# Patient Record
Sex: MALE | Race: WHITE | URBAN - METROPOLITAN AREA
[De-identification: names, ages, dates, MRNs, and addresses within clinical notes are randomized per-mention and may not be internally consistent; named-entity substitution may affect disease eponyms.]

---

## 2017-03-02 ENCOUNTER — ALLSCRIPTS OFFICE VISIT (OUTPATIENT)
Dept: OTHER | Facility: OTHER | Age: 22
End: 2017-03-02

## 2017-04-04 ENCOUNTER — ALLSCRIPTS OFFICE VISIT (OUTPATIENT)
Dept: OTHER | Facility: OTHER | Age: 22
End: 2017-04-04

## 2017-06-20 ENCOUNTER — ALLSCRIPTS OFFICE VISIT (OUTPATIENT)
Dept: OTHER | Facility: OTHER | Age: 22
End: 2017-06-20

## 2017-08-15 ENCOUNTER — ALLSCRIPTS OFFICE VISIT (OUTPATIENT)
Dept: OTHER | Facility: OTHER | Age: 22
End: 2017-08-15

## 2017-10-12 ENCOUNTER — ALLSCRIPTS OFFICE VISIT (OUTPATIENT)
Dept: OTHER | Facility: OTHER | Age: 22
End: 2017-10-12

## 2017-10-13 NOTE — PROGRESS NOTES
Assessment  1  Gastroenteritis (558 9) (K52 9)    Plan  Gastroenteritis    · Ondansetron HCl - 8 MG Oral Tablet; TAKE 1 TABLET Every 8 hours PRN  nausea   · *1 - [facility]  *  Status: Active   Requested for: 98YVP0855  Care Summary provided  : Yes    Discussion/Summary    Recommended GI evaluation considering frequent flareups of nausea and vomiting  He had seen gastroenterologist back in 2014encouraged him to follow-up with them as he is already taking pantoprazole daily  Call if no improvement in symptoms or any worsening or new symptoms over the coming days  Chief Complaint  nausea     History of Present Illness  HPI: Patient developed vomiting and nausea over the last 2 days  He's had 3 episodes of vomiting  Most recently this morning  He has had multiple episodes in his past medical history of these flareups  Review of Systems    Constitutional: no fever or chills, feels well, no tiredness, no recent weight loss or weight gain  ENT: no complaints of earache, no loss of hearing, no nosebleeds or nasal discharge, no sore throat or hoarseness  Cardiovascular: no complaints of slow or fast heart rate, no chest pain, no palpitations, no leg claudication or lower extremity edema  Respiratory: no complaints of shortness of breath, no wheezing or cough, no dyspnea on exertion, no orthopnea or PND  Gastrointestinal: no complaints of abdominal pain, no constipation, no nausea or vomiting, no diarrhea or bloody stools  Genitourinary: no complaints of dysuria or incontinence, no hesitancy, no nocturia, no genital lesion, no inadequacy of penile erection  Musculoskeletal: no complaints of arthralgia, no myalgia, no joint swelling or stiffness, no limb pain or swelling  Integumentary: no complaints of skin rash or lesion, no itching or dry skin, no skin wounds     Neurological: no complaints of headache, no confusion, no numbness or tingling, no dizziness or fainting  Active Problems  1  Abdominal pain, epigastric (789 06) (R10 13)   2  Acute maxillary sinusitis (461 0) (J01 00)   3  Gastroenteritis (558 9) (K52 9)   4  GERD without esophagitis (530 81) (K21 9)   5  Headache (784 0) (R51)   6  Panic attack (300 01) (F41 0)    Past Medical History  1  Acute sinusitis (461 9) (J01 90)   2  History of Acute sinusitis, recurrence not specified, unspecified location (461 9) (J01 90)   3  History of acute pharyngitis (V12 69) (Z87 09)   4  History of acute sinusitis (V12 69) (Z87 09)   5  History of acute sinusitis (V12 69) (Z87 09)   6  History of viral gastroenteritis (V12 09) (Z86 19)   7  History of Nausea (787 02) (R11 0)   8  History of Shoulder strain (840 9) (S46 919A)   9  History of Strain of thoracic region (847 1) (S29 019A)  Active Problems And Past Medical History Reviewed: The active problems and past medical history were reviewed and updated today  Family History  Mother    1  Family history of Anxiety  Brother    2  Family history of gastroesophageal reflux disease (V18 59) (Z83 79)  Family History Reviewed: The family history was reviewed and updated today  Social History   · Caffeine Use   · Never A Smoker   · Tobacco use (305 1) (Z72 0)    Current Meds   1  Fluticasone Propionate 50 MCG/ACT Nasal Suspension; USE 2 SPRAYS IN EACH   NOSTRIL ONCE DAILY; Therapy: 83MBN1345 to (Last CD:85BVX6057)  Requested for: 54WMW9447 Ordered   2  Ondansetron HCl - 8 MG Oral Tablet; TAKE 1 TABLET Every 8 hours PRN nausea; Therapy: 16SUO0372 to (Evaluate:72Qjg8369)  Requested for: 56IHI1643; Last   Rx:07Von8612 Ordered   3  Pantoprazole Sodium 40 MG Oral Tablet Delayed Release; TAKE 1 TABLET DAILY; Therapy: 17ITY5245 to (Evaluate:30Mar2018)  Requested for: 92RLN0309; Last   Rx:04Apr2017 Ordered   4  Rizatriptan Benzoate 10 MG Oral Tablet Disintegrating; TAKE 1 TABLET Daily PRN   headache;    Therapy: 96AHS5636 to (Last Rx:52Xlf1621)  Requested for: 26RHN2123 Ordered    The medication list was reviewed and updated today  Allergies  1  No Known Drug Allergies    Vitals   Recorded: 74ISS0698 01:08PM   Temperature 09 3 F   Systolic 288   Diastolic 80   Height 6 ft 2 in   Weight 252 lb    BMI Calculated 32 36   BSA Calculated 2 4     Physical Exam    Constitutional   General appearance: No acute distress, well appearing and well nourished  Eyes   Conjunctiva and lids: No swelling, erythema, or discharge  Pupils and irises: Equal, round and reactive to light  Ears, Nose, Mouth, and Throat   External inspection of ears and nose: Normal     Otoscopic examination: Tympanic membrance translucent with normal light reflex  Canals patent without erythema  Nasal mucosa, septum, and turbinates: Normal without edema or erythema  Oropharynx: Normal with no erythema, edema, exudate or lesions  Pulmonary   Respiratory effort: No increased work of breathing or signs of respiratory distress  Auscultation of lungs: Clear to auscultation, equal breath sounds bilaterally, no wheezes, no rales, no rhonci  Cardiovascular   Palpation of heart: Normal PMI, no thrills  Auscultation of heart: Normal rate and rhythm, normal S1 and S2, without murmurs  Examination of extremities for edema and/or varicosities: Normal     Carotid pulses: Normal     Abdomen   Abdomen: Non-tender, no masses  Liver and spleen: No hepatomegaly or splenomegaly  Lymphatic   Palpation of lymph nodes in neck: No lymphadenopathy  Musculoskeletal   Gait and station: Normal     Digits and nails: Normal without clubbing or cyanosis  Inspection/palpation of joints, bones, and muscles: Normal     Skin   Skin and subcutaneous tissue: Normal without rashes or lesions  Neurologic   Cranial nerves: Cranial nerves 2-12 intact  Reflexes: 2+ and symmetric  Sensation: No sensory loss      Psychiatric   Orientation to person, place and time: Normal     Mood and affect: Normal  Signatures   Electronically signed by : George Quintana DO; Oct 12 2017  1:55PM EST                       (Author)

## 2017-12-27 ENCOUNTER — ALLSCRIPTS OFFICE VISIT (OUTPATIENT)
Dept: OTHER | Facility: OTHER | Age: 22
End: 2017-12-27

## 2017-12-28 NOTE — PROGRESS NOTES
Assessment   1  Acute maxillary sinusitis (461 0) (J01 00)   2  Headache (784 0) (R51)    Plan   Acute maxillary sinusitis    · Azithromycin 250 MG Oral Tablet; TAKE 2 TABLETS ON DAY 1 THEN TAKE 1    TABLET A DAY FOR 4 DAYS   · Fluticasone Propionate 50 MCG/ACT Nasal Suspension; USE 2 SPRAYS IN EACH    NOSTRIL ONCE DAILY   · Irrigate your nose twice a day ; Status:Complete;   Done: 84TFL3597   · Call [phone] if: The fever has not gone away in 2 days ; Status:Complete;   Done:    46JEA9652   · Call [phone] if: Your sinus pain is worse ; Status:Complete;   Done: 91TCR5234  Headache    · Rizatriptan Benzoate 10 MG Oral Tablet Disintegrating; TAKE 1 TABLET Daily    PRN headache    Chief Complaint   sinus pressure, ears feel clogged      History of Present Illness        Sinusitis (Brief): The patient presents with complaints of gradual onset of intermittent episodes of mild facial pressure  Episodes started about 1 week ago  The patient is being seen for an initial evaluation of sinusitis  The sinusitis involves the maxillary sinuses  The sinusitis is classified as subacute  The patient is currently experiencing symptoms  The patient presents with complaints of gradual onset of intermittent episodes of mild facial pressure  Episodes started about 1 week ago  clear rhinorrhea postnasal drainage      Associated symptoms:  ear pain, but-- no fever,-- no chills-- and-- no nausea  Review of Systems        Constitutional: no fever or chills, feels well, no tiredness, no recent weight loss or weight gain  ENT: as noted in HPI  Cardiovascular: no complaints of slow or fast heart rate, no chest pain, no palpitations, no leg claudication or lower extremity edema  Respiratory: no complaints of shortness of breath, no wheezing or cough, no dyspnea on exertion, no orthopnea or PND        Gastrointestinal: no complaints of abdominal pain, no constipation, no nausea or vomiting, no diarrhea or bloody stools  Genitourinary: no complaints of dysuria or incontinence, no hesitancy, no nocturia, no genital lesion, no inadequacy of penile erection  Musculoskeletal: no complaints of arthralgia, no myalgia, no joint swelling or stiffness, no limb pain or swelling  Integumentary: no complaints of skin rash or lesion, no itching or dry skin, no skin wounds  Neurological: no complaints of headache, no confusion, no numbness or tingling, no dizziness or fainting  Active Problems   1  Abdominal pain, epigastric (789 06) (R10 13)   2  Acute maxillary sinusitis (461 0) (J01 00)   3  Gastroenteritis (558 9) (K52 9)   4  GERD without esophagitis (530 81) (K21 9)   5  Headache (784 0) (R51)   6  Panic attack (300 01) (F41 0)    Past Medical History   1  Acute sinusitis (461 9) (J01 90)   2  History of Acute sinusitis, recurrence not specified, unspecified location (461 9) (J01 90)   3  History of acute pharyngitis (V12 69) (Z87 09)   4  History of acute sinusitis (V12 69) (Z87 09)   5  History of acute sinusitis (V12 69) (Z87 09)   6  History of viral gastroenteritis (V12 09) (Z86 19)   7  History of Nausea (787 02) (R11 0)   8  History of Shoulder strain (840 9) (S46 919A)   9  History of Strain of thoracic region (847 1) (S29 019A)    Family History   Mother    1  Family history of Anxiety  Brother    2  Family history of gastroesophageal reflux disease (V18 59) (Z83 79)    Social History    · Caffeine Use   · Never A Smoker   · Tobacco use (305 1) (Z72 0)    Current Meds    1  Rizatriptan Benzoate 10 MG Oral Tablet Disintegrating; TAKE 1 TABLET Daily PRN     headache; Therapy: 53CLS7524 to (Last Rx:33Mti9406)  Requested for: 82Dfq5182 Ordered    Allergies   1   No Known Drug Allergies    Vitals    Recorded: 17KVN0128 12:34PM   Temperature 97 8 F   Systolic 662   Diastolic 82   Height 6 ft 2 in   Weight 251 lb    BMI Calculated 32 23   BSA Calculated 2 39     Physical Exam Constitutional      General appearance: No acute distress, well appearing and well nourished  Eyes      Conjunctiva and lids: No swelling, erythema, or discharge  Pupils and irises: Equal, round and reactive to light  Ears, Nose, Mouth, and Throat      External inspection of ears and nose: Normal        Otoscopic examination: Abnormal   The right tympanic membrane was red-- and-- was bulging  The left tympanic membrane was normal  The right external canal was normal  The left external canal was normal       Nasal mucosa, septum, and turbinates: Normal without edema or erythema  Oropharynx: Normal with no erythema, edema, exudate or lesions  Pulmonary      Respiratory effort: No increased work of breathing or signs of respiratory distress  Auscultation of lungs: Clear to auscultation, equal breath sounds bilaterally, no wheezes, no rales, no rhonci  Cardiovascular      Palpation of heart: Normal PMI, no thrills  Auscultation of heart: Normal rate and rhythm, normal S1 and S2, without murmurs  Examination of extremities for edema and/or varicosities: Normal        Carotid pulses: Normal        Abdomen      Abdomen: Non-tender, no masses  Liver and spleen: No hepatomegaly or splenomegaly  Lymphatic      Palpation of lymph nodes in neck: No lymphadenopathy  Musculoskeletal      Gait and station: Normal        Digits and nails: Normal without clubbing or cyanosis  Inspection/palpation of joints, bones, and muscles: Normal        Skin      Skin and subcutaneous tissue: Normal without rashes or lesions  Neurologic      Cranial nerves: Cranial nerves 2-12 intact  Reflexes: 2+ and symmetric  Sensation: No sensory loss         Psychiatric      Orientation to person, place and time: Normal        Mood and affect: Normal           Signatures    Electronically signed by : Nataly Terrell DO; Dec 27 2017  1:08PM EST (Author)

## 2018-01-12 NOTE — PROGRESS NOTES
Assessment    1  Acute sinusitis (461 9) (J01 90)    Plan  Acute sinusitis    · Levofloxacin 500 MG Oral Tablet; TAKE 1 TABLET DAILY AS DIRECTED    Discussion/Summary    Patient started on Levaquin 500 milligrams one daily x7 days and instructed to continue Mucinex when necessary  Patient encouraged drink plenty of fluids and rest  Patient to return the office in one week or sooner when necessary  Possible side effects of new medications were reviewed with the patient/guardian today  The treatment plan was reviewed with the patient/guardian  The patient/guardian understands and agrees with the treatment plan      Chief Complaint  HA, sore throat, sinus congestion      History of Present Illness  HPI: Patient started 2 days ago with nasal congestion, postnasal drainage and cough productive of yellow mucus  He admits to sinus pressure headache but denies any fever  Patient treated this with Mucinex and NyQuil  Cold Symptoms: Greyson Correia presents with complaints of cold symptoms  Associated symptoms include nasal congestion, post nasal drainage, scratchy throat, dry cough, productive cough, facial pressure, headache, wheezing, fatigue and chills, but no sore throat, no hoarseness, no shortness of breath and no fever  The patient presents with complaints of right ear pain  Active Problems    1  Acute sinusitis, recurrence not specified, unspecified location (461 9) (J01 90)   2  GERD without esophagitis (530 81) (K21 9)   3  Nausea (787 02) (R11 0)   4  Panic attack (300 01) (F41 0)   5  Shoulder strain (840 9) (S46 919A)   6  Viral gastroenteritis (008 8) (A08 4)    Past Medical History    1  Acute sinusitis (461 9) (J01 90)   2  History of acute pharyngitis (V12 69) (Z87 09)   3  History of acute sinusitis (V12 69) (Z87 09)   4  History of Strain of thoracic region (847 1) (S29 012A)    Family History    1  Family history of Anxiety    2   Family history of gastroesophageal reflux disease (V18 59) (Z83 79)    Social History    · Caffeine Use   · Never A Smoker   · Tobacco use (305 1) (Z72 0)    Current Meds   1  ALPRAZolam 0 25 MG Oral Tablet; 1 tablet every 8 hours as needed for anxiety; Therapy: 37AQG2752 to (Evaluate:12Jun2014); Last Rx:02Jun2014 Ordered    Allergies    1  No Known Drug Allergies    Vitals   Recorded: 26KXS9555 01:36PM Recorded: 09ZYR1343 01:01PM   Temperature  97 4 F   Heart Rate 76    Respiration 16    Systolic  962   Diastolic  74   Height  6 ft    Weight  262 lb    BMI Calculated  35 53   BSA Calculated  2 39     Physical Exam    Constitutional   General appearance: No acute distress, well appearing and well nourished  Eyes   Conjunctiva and lids: No swelling, erythema, or discharge  Ears, Nose, Mouth, and Throat   External inspection of ears and nose: Normal     Otoscopic examination: Tympanic membrance translucent with normal light reflex  Canals patent without erythema  Nasal mucosa, septum, and turbinates: Abnormal   There was a purulent discharge from both nares  The bilateral nasal mucosa was edematous  Oropharynx: Abnormal   pnd and injected  Pulmonary   Respiratory effort: No increased work of breathing or signs of respiratory distress  Auscultation of lungs: Clear to auscultation, equal breath sounds bilaterally, no wheezes, no rales, no rhonci  Cardiovascular   Auscultation of heart: Normal rate and rhythm, normal S1 and S2, without murmurs  Examination of extremities for edema and/or varicosities: Normal     Abdomen   Abdomen: Non-tender, no masses  Lymphatic   Palpation of lymph nodes in neck: No lymphadenopathy  Musculoskeletal   Gait and station: Normal     Skin   Skin and subcutaneous tissue: Normal without rashes or lesions      Psychiatric   Orientation to person, place and time: Normal     Mood and affect: Normal          Signatures   Electronically signed by : GRZEGORZ Llamas DO; Jan 18 2016  1:38PM EST (Author)

## 2018-01-13 VITALS
DIASTOLIC BLOOD PRESSURE: 82 MMHG | TEMPERATURE: 99 F | BODY MASS INDEX: 32.47 KG/M2 | WEIGHT: 253 LBS | SYSTOLIC BLOOD PRESSURE: 118 MMHG | HEIGHT: 74 IN

## 2018-01-13 VITALS
TEMPERATURE: 98.1 F | WEIGHT: 250 LBS | BODY MASS INDEX: 32.08 KG/M2 | SYSTOLIC BLOOD PRESSURE: 130 MMHG | DIASTOLIC BLOOD PRESSURE: 90 MMHG | HEIGHT: 74 IN

## 2018-01-14 VITALS
DIASTOLIC BLOOD PRESSURE: 80 MMHG | BODY MASS INDEX: 32.34 KG/M2 | SYSTOLIC BLOOD PRESSURE: 110 MMHG | WEIGHT: 252 LBS | TEMPERATURE: 99.2 F | HEIGHT: 74 IN

## 2018-01-14 VITALS
DIASTOLIC BLOOD PRESSURE: 82 MMHG | TEMPERATURE: 99.1 F | HEIGHT: 74 IN | SYSTOLIC BLOOD PRESSURE: 140 MMHG | WEIGHT: 250 LBS | BODY MASS INDEX: 32.08 KG/M2

## 2018-01-14 VITALS
HEIGHT: 74 IN | TEMPERATURE: 99 F | SYSTOLIC BLOOD PRESSURE: 130 MMHG | BODY MASS INDEX: 33.11 KG/M2 | WEIGHT: 258 LBS | DIASTOLIC BLOOD PRESSURE: 82 MMHG

## 2018-01-16 NOTE — MISCELLANEOUS
Message  Return to work or school:   Ollie Ghotra is under my professional care  He was seen in my office on 01/18/2016   He is able to return to work on  01/20/2016  EXCUSE 01/18 AND 01/19/2016      [address] [facility] [address]  Signatures   Electronically signed by :  Angel Jacome, ; Jan 18 2016  1:37PM EST                       (Author)

## 2018-01-18 NOTE — MISCELLANEOUS
Message  Return to work or school:   Kush Hopkins is under my professional care  He was seen in my office on 10/12/2017   He is able to return to work on  10/15/2017       Please excuse Leticia Barnes for 10/12/2017 and 10/13/2017        Signatures   Electronically signed by : Amilcar Reese MA; Oct 12 2017  1:51PM EST                       (Author)

## 2018-01-23 VITALS
HEIGHT: 74 IN | SYSTOLIC BLOOD PRESSURE: 140 MMHG | WEIGHT: 251 LBS | TEMPERATURE: 97.8 F | BODY MASS INDEX: 32.21 KG/M2 | DIASTOLIC BLOOD PRESSURE: 82 MMHG

## 2018-01-23 NOTE — MISCELLANEOUS
Message  Return to work or school:   Birder Stage is under my professional care  He was seen in my office on 12/27/17       Please excuse 12/27/17 and 12/28/17 and return 12/29/17          Signatures   Electronically signed by : Laura Granda, ; Dec 27 2017  1:08PM EST                       (Author)

## 2018-03-13 ENCOUNTER — OFFICE VISIT (OUTPATIENT)
Dept: FAMILY MEDICINE CLINIC | Facility: CLINIC | Age: 23
End: 2018-03-13
Payer: COMMERCIAL

## 2018-03-13 VITALS
BODY MASS INDEX: 34.67 KG/M2 | DIASTOLIC BLOOD PRESSURE: 86 MMHG | WEIGHT: 270 LBS | TEMPERATURE: 99.1 F | SYSTOLIC BLOOD PRESSURE: 138 MMHG

## 2018-03-13 DIAGNOSIS — H10.9 BACTERIAL CONJUNCTIVITIS: ICD-10-CM

## 2018-03-13 DIAGNOSIS — J01.00 ACUTE NON-RECURRENT MAXILLARY SINUSITIS: Primary | ICD-10-CM

## 2018-03-13 PROCEDURE — 99213 OFFICE O/P EST LOW 20 MIN: CPT | Performed by: FAMILY MEDICINE

## 2018-03-13 RX ORDER — RIZATRIPTAN BENZOATE 10 MG/1
1 TABLET, ORALLY DISINTEGRATING ORAL DAILY PRN
COMMUNITY
Start: 2017-04-04 | End: 2018-05-22 | Stop reason: SDUPTHER

## 2018-03-13 RX ORDER — AMOXICILLIN 500 MG/1
500 TABLET, FILM COATED ORAL 3 TIMES DAILY
Qty: 21 TABLET | Refills: 0 | Status: SHIPPED | OUTPATIENT
Start: 2018-03-13 | End: 2018-03-20

## 2018-03-13 NOTE — PROGRESS NOTES
Assessment/Plan:  Patient will call if symptoms persist or worsen  Side effect profile medications reviewed  Recommend over-the-counter symptomatic care as directed to  No provided for work today  No problem-specific Assessment & Plan notes found for this encounter  Diagnoses and all orders for this visit:    Acute non-recurrent maxillary sinusitis  -     amoxicillin (AMOXIL) 500 MG tablet; Take 1 tablet (500 mg total) by mouth 3 (three) times a day for 7 days    Bacterial conjunctivitis  -     neomycin-polymyxin-dexamethasone (MAXITROL) 0 1 % ophthalmic suspension; Administer 2 drops to both eyes 4 (four) times a day    Other orders  -     rizatriptan (MAXALT-MLT) 10 MG disintegrating tablet; Take 1 tablet by mouth daily as needed          Subjective:      Patient ID: Arnold Gaitan is a 23 y o  male  Patient with one-week history of sinus pressure and congestion and sinus headaches  He also awoke this morning with bilateral eye congestion and mild amount of discharge bilaterally  Denies any visual acuity deficit  No eye pain  He had admits to some eye itchiness  Sore Throat    Associated symptoms include coughing and headaches  Cough   Associated symptoms include eye redness, headaches and a sore throat  Headache    Associated symptoms include coughing, eye redness and a sore throat  Pertinent negatives include no eye pain or photophobia  The following portions of the patient's history were reviewed and updated as appropriate: allergies, current medications, past family history, past medical history, past social history, past surgical history and problem list     Review of Systems   HENT: Positive for sore throat  Eyes: Positive for redness and itching  Negative for photophobia and pain  Respiratory: Positive for cough  Neurological: Positive for headaches           Objective:      /86   Temp 99 1 °F (37 3 °C)   Wt 122 kg (270 lb)   BMI 34 67 kg/m² Physical Exam   Constitutional: He is oriented to person, place, and time  He appears well-developed and well-nourished  HENT:   Head: Normocephalic and atraumatic  Right Ear: External ear normal  Tympanic membrane is not erythematous and not bulging  Left Ear: External ear normal  Tympanic membrane is not erythematous and not bulging  Nose: Nose normal    Mouth/Throat: Oropharynx is clear and moist and mucous membranes are normal  No oral lesions  No oropharyngeal exudate  Eyes: EOM are normal  Right eye exhibits no discharge  Left eye exhibits no discharge  No scleral icterus  Bilateral conjunctiva is slightly injected  Neck: Normal range of motion  Neck supple  No thyromegaly present  Cardiovascular: Normal rate, regular rhythm and normal heart sounds  Exam reveals no gallop and no friction rub  No murmur heard  Pulmonary/Chest: Effort normal  No respiratory distress  He has no wheezes  He has no rales  He exhibits no tenderness  Abdominal: Soft  Bowel sounds are normal  He exhibits no distension and no mass  There is no tenderness  There is no rebound and no guarding  Musculoskeletal: Normal range of motion  He exhibits no edema, tenderness or deformity  Lymphadenopathy:     He has no cervical adenopathy  Neurological: He is alert and oriented to person, place, and time  He has normal reflexes  No cranial nerve deficit  He exhibits normal muscle tone  Coordination normal    Skin: Skin is warm and dry  No rash noted  No erythema  No pallor  Psychiatric: He has a normal mood and affect  His behavior is normal    Vitals reviewed

## 2018-03-13 NOTE — LETTER
March 13, 2018     Patient: Dada Goodwin   YOB: 1995   Date of Visit: 3/13/2018       To Whom it May Concern:    Fitzpatrick Karley is under my professional care  He was seen in my office on 3/13/2018  Please excuse him for his absence on 03/13/2018 and 03/14/2018  He may return to work on 03/15/2018  If you have any questions or concerns, please don't hesitate to call           Sincerely,          Sanchez Alvarenga DO        CC: No Recipients

## 2018-05-22 ENCOUNTER — OFFICE VISIT (OUTPATIENT)
Dept: FAMILY MEDICINE CLINIC | Facility: CLINIC | Age: 23
End: 2018-05-22
Payer: COMMERCIAL

## 2018-05-22 VITALS
TEMPERATURE: 99 F | BODY MASS INDEX: 34.4 KG/M2 | SYSTOLIC BLOOD PRESSURE: 126 MMHG | WEIGHT: 254 LBS | HEIGHT: 72 IN | DIASTOLIC BLOOD PRESSURE: 100 MMHG

## 2018-05-22 DIAGNOSIS — G43.909 MIGRAINE WITHOUT STATUS MIGRAINOSUS, NOT INTRACTABLE, UNSPECIFIED MIGRAINE TYPE: ICD-10-CM

## 2018-05-22 DIAGNOSIS — K52.9 GASTROENTERITIS: Primary | ICD-10-CM

## 2018-05-22 PROCEDURE — 99213 OFFICE O/P EST LOW 20 MIN: CPT | Performed by: FAMILY MEDICINE

## 2018-05-22 RX ORDER — ONDANSETRON 8 MG/1
8 TABLET, ORALLY DISINTEGRATING ORAL EVERY 6 HOURS PRN
Qty: 12 TABLET | Refills: 0 | Status: SHIPPED | OUTPATIENT
Start: 2018-05-22 | End: 2018-09-11

## 2018-05-22 RX ORDER — RIZATRIPTAN BENZOATE 10 MG/1
10 TABLET, ORALLY DISINTEGRATING ORAL DAILY PRN
Qty: 9 TABLET | Refills: 0 | Status: SHIPPED | OUTPATIENT
Start: 2018-05-22 | End: 2018-09-11 | Stop reason: SDUPTHER

## 2018-05-22 NOTE — PROGRESS NOTES
Assessment/Plan:  Anticipatory guidance provided  Recommend return to office for re-evaluation if no improvement or worsening symptoms  Side effect profile medication reviewed  Recommended increasing p o  fluid intake over the next several days  He will call us with any recurrent symptoms or worsening symptoms as well  No problem-specific Assessment & Plan notes found for this encounter  Diagnoses and all orders for this visit:    Gastroenteritis  -     ondansetron (ZOFRAN-ODT) 8 mg disintegrating tablet; Take 1 tablet (8 mg total) by mouth every 6 (six) hours as needed for nausea or vomiting    Migraine without status migrainosus, not intractable, unspecified migraine type  -     rizatriptan (MAXALT-MLT) 10 MG disintegrating tablet; Take 1 tablet (10 mg total) by mouth daily as needed for migraine          Subjective:      Patient ID: Davin Arce is a 20 y o  male  Patient had 2 episodes of vomiting yesterday  He has nausea today  No fevers  No abdominal pain  He still has some diminished appetite today  No else at home has been sick  He does not recall eating anything unusual   He also needs refill on his migraine medication  Vomiting    Pertinent negatives include no abdominal pain or diarrhea  Nausea   Associated symptoms include nausea and vomiting  Pertinent negatives include no abdominal pain  The following portions of the patient's history were reviewed and updated as appropriate: allergies, current medications, past family history, past medical history, past social history, past surgical history and problem list     Review of Systems   Constitutional: Negative  HENT: Negative  Eyes: Negative  Respiratory: Negative  Cardiovascular: Negative  Gastrointestinal: Positive for nausea and vomiting  Negative for abdominal pain and diarrhea  Endocrine: Negative  Genitourinary: Negative  Musculoskeletal: Negative  Skin: Negative      Allergic/Immunologic: Negative  Neurological: Negative  Hematological: Negative  Psychiatric/Behavioral: Negative  Objective:      /100   Temp 99 °F (37 2 °C)   Ht 6' (1 829 m)   Wt 115 kg (254 lb)   BMI 34 45 kg/m²          Physical Exam   Constitutional: He is oriented to person, place, and time  He appears well-developed and well-nourished  HENT:   Head: Normocephalic and atraumatic  Right Ear: External ear normal  Tympanic membrane is not erythematous and not bulging  Left Ear: External ear normal  Tympanic membrane is not erythematous and not bulging  Nose: Nose normal    Mouth/Throat: Oropharynx is clear and moist and mucous membranes are normal  No oral lesions  No oropharyngeal exudate  Eyes: Conjunctivae and EOM are normal  Right eye exhibits no discharge  Left eye exhibits no discharge  No scleral icterus  Neck: Normal range of motion  Neck supple  No thyromegaly present  Cardiovascular: Normal rate, regular rhythm and normal heart sounds  Exam reveals no gallop and no friction rub  No murmur heard  Pulmonary/Chest: Effort normal  No respiratory distress  He has no wheezes  He has no rales  He exhibits no tenderness  Abdominal: Soft  Bowel sounds are normal  He exhibits no distension and no mass  There is no tenderness  There is no rebound and no guarding  Musculoskeletal: Normal range of motion  He exhibits no edema, tenderness or deformity  Lymphadenopathy:     He has no cervical adenopathy  Neurological: He is alert and oriented to person, place, and time  He has normal reflexes  No cranial nerve deficit  He exhibits normal muscle tone  Coordination normal    Skin: Skin is warm and dry  No rash noted  No erythema  No pallor  Psychiatric: He has a normal mood and affect  His behavior is normal    Vitals reviewed

## 2018-05-22 NOTE — LETTER
May 22, 2018     Patient: Abdiel Gonzalez   YOB: 1995   Date of Visit: 5/22/2018       To Whom it May Concern:    Mark Gilmore is under my professional care  He was seen in my office on 5/22/2018  He may return to work on 5/24/18  Please excuse 5/22/18 and 5/23/18  If you have any questions or concerns, please don't hesitate to call           Sincerely,          Shazia Downs DO        CC: No Recipients

## 2018-09-11 ENCOUNTER — OFFICE VISIT (OUTPATIENT)
Dept: FAMILY MEDICINE CLINIC | Facility: CLINIC | Age: 23
End: 2018-09-11
Payer: COMMERCIAL

## 2018-09-11 VITALS
SYSTOLIC BLOOD PRESSURE: 144 MMHG | BODY MASS INDEX: 33.11 KG/M2 | HEIGHT: 74 IN | HEART RATE: 114 BPM | TEMPERATURE: 99.2 F | WEIGHT: 258 LBS | OXYGEN SATURATION: 98 % | DIASTOLIC BLOOD PRESSURE: 100 MMHG

## 2018-09-11 DIAGNOSIS — G43.909 MIGRAINE WITHOUT STATUS MIGRAINOSUS, NOT INTRACTABLE, UNSPECIFIED MIGRAINE TYPE: Primary | ICD-10-CM

## 2018-09-11 DIAGNOSIS — L23.7 POISON IVY DERMATITIS: ICD-10-CM

## 2018-09-11 PROCEDURE — 99214 OFFICE O/P EST MOD 30 MIN: CPT | Performed by: FAMILY MEDICINE

## 2018-09-11 PROCEDURE — 3008F BODY MASS INDEX DOCD: CPT | Performed by: FAMILY MEDICINE

## 2018-09-11 RX ORDER — RIZATRIPTAN BENZOATE 10 MG/1
10 TABLET, ORALLY DISINTEGRATING ORAL DAILY PRN
Qty: 9 TABLET | Refills: 0 | Status: SHIPPED | OUTPATIENT
Start: 2018-09-11 | End: 2019-01-29 | Stop reason: SDUPTHER

## 2018-09-11 RX ORDER — KETOROLAC TROMETHAMINE 30 MG/ML
60 INJECTION, SOLUTION INTRAMUSCULAR; INTRAVENOUS ONCE
Status: DISCONTINUED | OUTPATIENT
Start: 2018-09-11 | End: 2018-09-11

## 2018-09-11 RX ORDER — MOMETASONE FUROATE 1 MG/G
CREAM TOPICAL DAILY
Qty: 45 G | Refills: 0 | Status: SHIPPED | OUTPATIENT
Start: 2018-09-11 | End: 2019-01-29

## 2018-09-11 RX ORDER — AZITHROMYCIN 250 MG/1
TABLET, FILM COATED ORAL
Qty: 6 TABLET | Refills: 0 | Status: SHIPPED | OUTPATIENT
Start: 2018-09-11 | End: 2018-09-18

## 2018-09-11 NOTE — PROGRESS NOTES
Assessment/Plan:  Recommended re-evaluation if no improvement  Toradol injection given for migraines with patient's informed consent  Side effect profile of medications reviewed  Refill on Imitrex provided  Toradol 60 mg given to left deltoid area with patient's informed consent  No problem-specific Assessment & Plan notes found for this encounter  Diagnoses and all orders for this visit:    Migraine without status migrainosus, not intractable, unspecified migraine type  -     ketorolac (TORADOL) 60 mg/2 mL IM injection 60 mg; Inject 2 mL (60 mg total) into a muscle once   -     azithromycin (ZITHROMAX) 250 mg tablet; Take 2 tablets today then 1 tablet daily x 4 days  -     rizatriptan (MAXALT-MLT) 10 MG disintegrating tablet; Take 1 tablet (10 mg total) by mouth daily as needed for migraine    Poison ivy dermatitis  -     mometasone (ELOCON) 0 1 % cream; Apply topically daily          Subjective:      Patient ID: Estuardo Casas is a 20 y o  male  Patient with history of migraine headaches  He is out of his Imitrex and needs a refill  He has recent migraine symptoms over the last day or 2 with some nausea but no vomiting  He does have some sinus pressure and congestion at times  He also has poison ivy rash to the arms bilaterally  Onset several days ago  Rash is mildly itchy  Denies any fevers  No neck pain  Headaches as noted above are mild-to-moderate  Migraine          The following portions of the patient's history were reviewed and updated as appropriate: allergies, current medications, past family history, past medical history, past social history, past surgical history and problem list     Review of Systems   Constitutional: Negative  HENT: Negative  Eyes: Negative  Respiratory: Negative  Cardiovascular: Negative  Gastrointestinal: Negative  Endocrine: Negative  Genitourinary: Negative  Musculoskeletal: Negative  Skin: Negative  Allergic/Immunologic: Negative  Neurological:        As noted in history of present illness   Hematological: Negative  Psychiatric/Behavioral: Negative  Objective:      /100 (BP Location: Left arm, Patient Position: Sitting, Cuff Size: Large)   Pulse (!) 114   Temp 99 2 °F (37 3 °C) (Tympanic)   Ht 6' 1 62" (1 87 m)   Wt 117 kg (258 lb)   SpO2 98%   BMI 33 47 kg/m²          Physical Exam   Constitutional: He is oriented to person, place, and time  He appears well-developed and well-nourished  HENT:   Head: Normocephalic and atraumatic  Right Ear: External ear normal  Tympanic membrane is not erythematous and not bulging  Left Ear: External ear normal  Tympanic membrane is not erythematous and not bulging  Nose: Nose normal    Mouth/Throat: Oropharynx is clear and moist and mucous membranes are normal  No oral lesions  No oropharyngeal exudate  Eyes: Conjunctivae and EOM are normal  Right eye exhibits no discharge  Left eye exhibits no discharge  No scleral icterus  Neck: Normal range of motion  Neck supple  No thyromegaly present  Cardiovascular: Normal rate, regular rhythm and normal heart sounds  Exam reveals no gallop and no friction rub  No murmur heard  Pulmonary/Chest: Effort normal  No respiratory distress  He has no wheezes  He has no rales  He exhibits no tenderness  Abdominal: Soft  Bowel sounds are normal  He exhibits no distension and no mass  There is no tenderness  There is no rebound and no guarding  Musculoskeletal: Normal range of motion  He exhibits no edema, tenderness or deformity  Lymphadenopathy:     He has no cervical adenopathy  Neurological: He is alert and oriented to person, place, and time  He has normal reflexes  No cranial nerve deficit  He exhibits normal muscle tone  Coordination normal    Skin: Skin is warm and dry   No rash (Patchy erythema to the arms and legs with some macules and papules present consistent with contact dermatitis ) noted  No erythema  No pallor  Psychiatric: He has a normal mood and affect  His behavior is normal    Vitals reviewed

## 2018-09-11 NOTE — LETTER
September 11, 2018     Patient: Judi Arizmendi   YOB: 1995   Date of Visit: 9/11/2018       To Whom it May Concern:    Doug Varghese is under my professional care  He was seen in my office on 9/11/2018  He may return to work on 9/13/2018  If you have any questions or concerns, please don't hesitate to call           Sincerely,          Sima Kim DO        CC: No Recipients

## 2019-01-29 ENCOUNTER — OFFICE VISIT (OUTPATIENT)
Dept: FAMILY MEDICINE CLINIC | Facility: CLINIC | Age: 24
End: 2019-01-29
Payer: COMMERCIAL

## 2019-01-29 VITALS
HEIGHT: 74 IN | DIASTOLIC BLOOD PRESSURE: 84 MMHG | TEMPERATURE: 100.3 F | BODY MASS INDEX: 33.75 KG/M2 | WEIGHT: 263 LBS | SYSTOLIC BLOOD PRESSURE: 126 MMHG | HEART RATE: 110 BPM | OXYGEN SATURATION: 98 %

## 2019-01-29 DIAGNOSIS — G43.909 MIGRAINE WITHOUT STATUS MIGRAINOSUS, NOT INTRACTABLE, UNSPECIFIED MIGRAINE TYPE: ICD-10-CM

## 2019-01-29 DIAGNOSIS — K29.70 GASTRITIS WITHOUT BLEEDING, UNSPECIFIED CHRONICITY, UNSPECIFIED GASTRITIS TYPE: Primary | ICD-10-CM

## 2019-01-29 PROCEDURE — 99213 OFFICE O/P EST LOW 20 MIN: CPT | Performed by: FAMILY MEDICINE

## 2019-01-29 PROCEDURE — 3008F BODY MASS INDEX DOCD: CPT | Performed by: FAMILY MEDICINE

## 2019-01-29 RX ORDER — RIZATRIPTAN BENZOATE 10 MG/1
10 TABLET, ORALLY DISINTEGRATING ORAL DAILY PRN
Qty: 27 TABLET | Refills: 1 | Status: SHIPPED | OUTPATIENT
Start: 2019-01-29

## 2019-01-29 RX ORDER — ONDANSETRON HYDROCHLORIDE 8 MG/1
8 TABLET, FILM COATED ORAL EVERY 8 HOURS PRN
Qty: 20 TABLET | Refills: 0 | Status: SHIPPED | OUTPATIENT
Start: 2019-01-29 | End: 2019-07-12 | Stop reason: ALTCHOICE

## 2019-01-29 NOTE — LETTER
January 29, 2019     Patient: Dotty Concepcion   YOB: 1995   Date of Visit: 1/29/2019       To Whom it May Concern:    Keila Recinos is under my professional care  He was seen in my office on 1/29/2019  He may return to work on 01/31/19  Please excuse him for his absence on 01/29/19 as well as 01/30/19  If you have any questions or concerns, please don't hesitate to call           Sincerely,          Thu Mcmullen DO        CC: No Recipients

## 2019-01-29 NOTE — PROGRESS NOTES
Assessment/Plan:  Recommended clear liquids as tolerated and advance to brat diet  Side effect profile medication reviewed  Return to office if symptoms persist or worsen  He will call if new symptoms develop  No problem-specific Assessment & Plan notes found for this encounter  Diagnoses and all orders for this visit:    Gastritis without bleeding, unspecified chronicity, unspecified gastritis type  -     ondansetron (ZOFRAN) 8 mg tablet; Take 1 tablet (8 mg total) by mouth every 8 (eight) hours as needed for nausea or vomiting    Migraine without status migrainosus, not intractable, unspecified migraine type  -     rizatriptan (MAXALT-MLT) 10 MG disintegrating tablet; Take 1 tablet (10 mg total) by mouth daily as needed for migraine          Subjective:      Patient ID: Selena Mckenna is a 20 y o  male  Patient with nausea and vomiting over the last 24 hr   He had other relatives or sick with similar symptoms  Denies any diarrhea  No abdominal pain  Denies any fever sweats or chills  Patient also needs refill on his migraine medication  He states this works well for him  Generalized Body Aches   Associated symptoms include vomiting  Vomiting          The following portions of the patient's history were reviewed and updated as appropriate: allergies, current medications, past family history, past medical history, past social history, past surgical history and problem list     Review of Systems   Constitutional: Negative  HENT: Negative  Eyes: Negative  Respiratory: Negative  Cardiovascular: Negative  Gastrointestinal: Positive for vomiting  As noted in HPI   Endocrine: Negative  Genitourinary: Negative  Musculoskeletal: Negative  Skin: Negative  Allergic/Immunologic: Negative  Neurological: Negative  Hematological: Negative  Psychiatric/Behavioral: Negative            Objective:      /84 (BP Location: Left arm, Patient Position: Sitting, Cuff Size: Standard)   Pulse (!) 110   Temp 100 3 °F (37 9 °C) (Tympanic)   Ht 6' 2 02" (1 88 m)   Wt 119 kg (263 lb)   SpO2 98%   BMI 33 75 kg/m²          Physical Exam   Constitutional: He is oriented to person, place, and time  He appears well-developed and well-nourished  HENT:   Head: Normocephalic and atraumatic  Right Ear: External ear normal  Tympanic membrane is not erythematous and not bulging  Left Ear: External ear normal  Tympanic membrane is not erythematous and not bulging  Nose: Nose normal    Mouth/Throat: Oropharynx is clear and moist and mucous membranes are normal  No oral lesions  No oropharyngeal exudate  Eyes: Conjunctivae and EOM are normal  Right eye exhibits no discharge  Left eye exhibits no discharge  No scleral icterus  Neck: Normal range of motion  Neck supple  No thyromegaly present  Cardiovascular: Normal rate, regular rhythm and normal heart sounds  Exam reveals no gallop and no friction rub  No murmur heard  Pulmonary/Chest: Effort normal  No respiratory distress  He has no wheezes  He has no rales  He exhibits no tenderness  Abdominal: Soft  Bowel sounds are normal  He exhibits no distension and no mass  There is no tenderness  There is no rebound and no guarding  Musculoskeletal: Normal range of motion  He exhibits no edema, tenderness or deformity  Lymphadenopathy:     He has no cervical adenopathy  Neurological: He is alert and oriented to person, place, and time  He has normal reflexes  No cranial nerve deficit  He exhibits normal muscle tone  Coordination normal    Skin: Skin is warm and dry  No rash noted  No erythema  No pallor  Psychiatric: He has a normal mood and affect  His behavior is normal    Vitals reviewed

## 2019-06-27 ENCOUNTER — OFFICE VISIT (OUTPATIENT)
Dept: FAMILY MEDICINE CLINIC | Facility: CLINIC | Age: 24
End: 2019-06-27
Payer: COMMERCIAL

## 2019-06-27 ENCOUNTER — PATIENT MESSAGE (OUTPATIENT)
Dept: FAMILY MEDICINE CLINIC | Facility: CLINIC | Age: 24
End: 2019-06-27

## 2019-06-27 VITALS
DIASTOLIC BLOOD PRESSURE: 90 MMHG | BODY MASS INDEX: 32.73 KG/M2 | WEIGHT: 255 LBS | SYSTOLIC BLOOD PRESSURE: 142 MMHG | TEMPERATURE: 97.8 F

## 2019-06-27 DIAGNOSIS — S86.911A STRAIN OF RIGHT KNEE, INITIAL ENCOUNTER: ICD-10-CM

## 2019-06-27 DIAGNOSIS — F41.0 PANIC ATTACKS: ICD-10-CM

## 2019-06-27 DIAGNOSIS — F41.1 GENERALIZED ANXIETY DISORDER: Primary | ICD-10-CM

## 2019-06-27 PROCEDURE — 99214 OFFICE O/P EST MOD 30 MIN: CPT | Performed by: FAMILY MEDICINE

## 2019-06-27 RX ORDER — FLUOXETINE HYDROCHLORIDE 20 MG/1
20 CAPSULE ORAL DAILY
Qty: 90 CAPSULE | Refills: 1 | Status: SHIPPED | OUTPATIENT
Start: 2019-06-27 | End: 2019-12-15 | Stop reason: SDUPTHER

## 2019-06-27 RX ORDER — ALPRAZOLAM 0.25 MG/1
TABLET ORAL AS NEEDED
COMMUNITY

## 2019-06-28 ENCOUNTER — TELEPHONE (OUTPATIENT)
Dept: FAMILY MEDICINE CLINIC | Facility: CLINIC | Age: 24
End: 2019-06-28

## 2019-12-15 DIAGNOSIS — F41.0 PANIC ATTACKS: ICD-10-CM

## 2019-12-15 DIAGNOSIS — F41.1 GENERALIZED ANXIETY DISORDER: ICD-10-CM

## 2019-12-15 RX ORDER — FLUOXETINE HYDROCHLORIDE 20 MG/1
CAPSULE ORAL
Qty: 90 CAPSULE | Refills: 1 | Status: SHIPPED | OUTPATIENT
Start: 2019-12-15

## 2020-01-22 ENCOUNTER — TELEPHONE (OUTPATIENT)
Dept: FAMILY MEDICINE CLINIC | Facility: CLINIC | Age: 25
End: 2020-01-22

## 2020-01-22 NOTE — TELEPHONE ENCOUNTER
Took call from patient stating he would like his records sent to a new provider  Patient is no longer our patient  Explained to patient the medical records process  Patient doesn't know the name of new provider but will call us back with that information  Care team updated

## 2020-01-24 ENCOUNTER — TELEPHONE (OUTPATIENT)
Dept: FAMILY MEDICINE CLINIC | Facility: CLINIC | Age: 25
End: 2020-01-24

## 2020-01-24 NOTE — TELEPHONE ENCOUNTER
Received signed request for release of health information to release patients entire medical to [facility]   Request sent to [facility] for processing today

## 2020-01-28 ENCOUNTER — TELEPHONE (OUTPATIENT)
Dept: FAMILY MEDICINE CLINIC | Facility: CLINIC | Age: 25
End: 2020-01-28

## 2020-01-28 NOTE — TELEPHONE ENCOUNTER
Liliana called stating that records are not coming through from [id number]  Called [employer] and spoke with Barby Desouza who stated she will cancel the fax and send via mail